# Patient Record
(demographics unavailable — no encounter records)

---

## 2017-01-29 NOTE — ER DOCUMENT REPORT
ED Medical Screen (RME)





- General


Stated Complaint: FLU LIKE SYMPTOMS


Notes: 


Flulike symptoms for several days diarrhea for 2 days


I greeted and performed a rapid initial assessment of this patient. 

Comprehensive ED assessment and evaluation of the patient, analysis of test 

results and completion of the medical decision making process will be conducted 

by additional ED providers.





Physical Exam





- Vital signs


Vitals: 





 











Temp Pulse Resp BP Pulse Ox


 


 100.3 F   97   16   106/66   96 


 


 01/29/17 13:23  01/29/17 13:23  01/29/17 13:23  01/29/17 13:23  01/29/17 13:23














Course





- Vital Signs


Vital signs: 





 











Temp Pulse Resp BP Pulse Ox


 


 100.3 F   97   16   106/66   96 


 


 01/29/17 13:23  01/29/17 13:23  01/29/17 13:23  01/29/17 13:23  01/29/17 13:23

## 2017-01-29 NOTE — ER DOCUMENT REPORT
ED General





- General


Chief Complaint: Flu Symptoms


Stated Complaint: FLU LIKE SYMPTOMS


Mode of Arrival: Ambulatory


Information source: Patient


Notes: 


54-year-old female presents with one week duration of sore throat and 

nonproductive cough shortness of breath.  Patient admits to fevers and chills 

nausea vomiting and diarrhea.  Patient had a telemedicine evaluion and was 

placed on azithromycin








TRAVEL OUTSIDE OF THE [place] IN LAST 30 DAYS: No





- HPI


Onset: Last week


Onset/Duration: Persistent


Quality of pain: Achy


Severity: Mild


Pain Level: 1


Associated symptoms: Nonproductive cough, Diarrhea, Fever, Nausea, Vomiting


Exacerbated by: Denies


Relieved by: Denies


Similar symptoms previously: Yes


Recently seen / treated by doctor: Yes





- Related Data


Allergies/Adverse Reactions: 


 





codeine Allergy (Verified 01/29/17 13:27)


 











Past Medical History





- Social History


Smoking Status: Never Smoker


Cigarette use (# per day): No


Chew tobacco use (# tins/day): No


Smoking Education Provided: No


Frequency of alcohol use: None


Drug Abuse: None


Family History: Reviewed & Not Pertinent


Patient has suicidal ideation: No


Patient has homicidal ideation: No


Renal/ Medical History: Denies: Hx Peritoneal Dialysis





Review of Systems





- Review of Systems


Notes: 


REVIEW OF SYSTEMS:


CONSTITUTIONAL : Admits fever


EENT: Admits to sore throat


CARDIOVASCULAR:  Denies chest pain.  Denies palpitations or racing or irregular 

heart beat.  Denies ankle edema.


RESPIRATORY: Admits cough congestion


GASTROINTESTINAL: Admits nausea vomiting diarrhea


GENITOURINARY:  Denies difficulty urinating, painful urination, burning, 

frequency, blood in urine, or discharge.


FEMALE  GENITOURINARY:  Denies vaginal bleeding, heavy or abnormal periods, 

irregular periods.  Denies vaginal discharge or odor. 


MUSCULOSKELETAL:  Denies back or neck pain or stiffness.  Denies joint pain or 

swelling.


SKIN:   Denies rash, lesions or sores.


HEMATOLOGIC :   Denies easy bruising or bleeding.


LYMPHATIC:  Denies swollen, enlarged glands.


NEUROLOGICAL:  Denies confusion or altered mental status.  Denies passing out 

or loss of consciousness.  Denies dizziness or lightheadedness.  Denies 

headache.  Denies weakness or paralysis or loss of use of either side.  Denies 

problems with gait or speech.  Denies sensory loss, numbness, or tingling.  

Denies seizures.


PSYCHIATRIC:  Denies anxiety or stress.  Denies depression, suicidal ideation, 

or homicidal ideation.





ALL OTHER SYSTEMS REVIEWED AND NEGATIVE.








Dictation was performed using Dragon voice recognition software 








PHYSICAL EXAMINATION:





GENERAL: Well-appearing, well-nourished and in no acute distress.





HEAD: Atraumatic, normocephalic.





EYES: Pupils equal round and reactive to light, extraocular movements intact, 

conjunctiva are normal.





ENT: exudated 2 is on the right tonsillar pillar 3 on the left airway patent





NECK: Normal range of motion, supple without lymphadenopathy





LUNGS: Breath sounds clear to auscultation bilaterally and equal.  No wheezes 

rales or rhonchi.





HEART: Regular rate and rhythm without murmurs





ABDOMEN: Soft, nontender, nondistended abdomen.  No guarding, no rebound.  No 

masses appreciated.





Female : deferred





Musculoskeletal: Normal range of motion, no pitting or edema.  No cyanosis.





NEUROLOGICAL: Cranial nerves grossly intact.  Normal speech, normal gait.  

Normal sensory, motor exams





PSYCH: Normal mood, normal affect.





SKIN: Warm, Dry, normal turgor, no rashes or lesions noted.




















Physical Exam





- Vital signs


Vitals: 





 











Temp Pulse Resp BP Pulse Ox


 


 100.3 F   97   16   106/66   96 


 


 01/29/17 13:23  01/29/17 13:23  01/29/17 13:23  01/29/17 13:23  01/29/17 13:23














Course





- Re-evaluation


Re-evalutation: 


01/29/17 15:12





On examination patient does have pustular exudates, however given that her x-

rays consistent with pneumonia I will treat her with Levaquin patient had 

probable viral syndrome that worsened to strep pneumonia.  Otherwise vital 

signs are stable, she is febrile here given Tylenol notes significant 

improvement with breathing treatment











After performing a Medical Screening Examination, I estimate there is LOW risk 

for ACUTE CORONARY SYNDROME, RESPIRATORY FAILURE, SEPSIS OR MENINGITIS, thus I 

consider the discharge disposition reasonable. The patient and I have discussed 

the diagnosis and risks, and we agree with discharging home with close follow-

up. We also discussed returning to the Emergency Department immediately if new 

or worsening symptoms occur. We have discussed the symptoms which are most 

concerning (e.g., changing or worsening pain, trouble swallowing or breathing, 

neck stiffness, fever) that necessitate immediate return.





01/29/17 15:13








- Vital Signs


Vital signs: 





 











Temp Pulse Resp BP Pulse Ox


 


 100.3 F   97   16   106/66   96 


 


 01/29/17 13:23  01/29/17 13:23  01/29/17 13:23  01/29/17 13:23  01/29/17 13:23














- Diagnostic Test


Radiology reviewed: Image reviewed, Reports reviewed - RLL peumonia report to 

patient





Discharge





- Discharge


Clinical Impression: 


 Nausea vomiting and diarrhea





Pneumonia


Qualifiers:


 Pneumonia type: due to unspecified organism Laterality: right Lung location: 

lower lobe of lung Qualified Code(s): J18.1 - Lobar pneumonia, unspecified 

organism





Fever


Qualifiers:


 Fever type: unspecified Qualified Code(s): R50.9 - Fever, unspecified





Condition: Stable


Disposition: HOME, SELF-CARE


Instructions:  Pneumonia ([facility])


Additional Instructions: 


Follow up with your physician tomorrow for further care or return to the ED 

IMMEDIATELY if symptoms worsen or new concerns occur


Prescriptions: 


Levofloxacin [Levaquin 750 mg Tablet] 750 mg PO DAILY #5 tablet


Prednisone [Deltasone 20 mg Tablet] 3 tab PO DAILY 5 Days


Promethazine HCl [Phenergan 25 mg Tablet] 1 - 2 tab PO Q6H PRN #15 tablet


 PRN Reason:

## 2017-11-29 NOTE — WOMENS IMAGING REPORT
EXAM DESCRIPTION:  BILAT SCREENING MAMMO W/CAD



COMPLETED DATE/TIME:  11/29/2017 10:03 am



REASON FOR STUDY:  ROUTINE SCREENING; Z12.31 Z12.31  ENCNTR SCREEN MAMMOGRAM FOR MALIGNANT NEOPLASM O
F MARCIA



COMPARISON:  No previous available



TECHNIQUE:  Standard craniocaudal and mediolateral oblique views of each breast recorded using digita
l acquisition. Additional "push-back" craniocaudal and mediolateral oblique images acquired.



LIMITATIONS:  None.



FINDINGS:  IMPLANTS: Bilateral subpectoral intact

RIGHT BREAST

MASSES: No suspicious masses.

CALCIFICATIONS: No new or suspicious calcifications.

ARCHITECTURAL DISTORTION: None.

DEVELOPING DENSITY: None.

ASYMMETRY: None noted.

OTHER: No other significant findings.

LEFT BREAST

MASSES: Tiny 3 to 4 mm low-density well-circumscribed nodule in the left breast periareolar region 11
 to 12 o'clock position.  Ultrasound of this finding is recommended for followup.

CALCIFICATIONS: No new or suspicious calcifications.

ARCHITECTURAL DISTORTION: None.

DEVELOPING DENSITY: None.

ASYMMETRY: None noted.

OTHER: No other significant findings.

Read with the assistance of CAD.

.[facility] - R2 Cenova Version 1.3

.[facility] - R2 Cenova Version 1.3

.[facility] - R2 Cenova Version 2.4

.[facility] [place]2 [place]ova Version 2.4

.[facility] - R2  Version 9.2



IMPRESSION:  No mammographic evidence for malignancy right breast.

3 to 4 mm well-circumscribed low-density nodule left periareolar region for which ultrasound is recom
mended for followup



BREAST DENSITY:  b. There are scattered areas of fibroglandular density.



BIRAD:  0 Incomplete:  Needs Additional Imaging Evaluation and/or prior Mammograms for Comparison.



RECOMMENDATION:  RECOMMENDED FOLLOW-UP: Left breast ultrasound

The patient will be contacted for additional imaging.



COMMENT:  The patient has been notified of the results by letter per SA requirements. Additional no
tification policies are in place for contacting patient with suspicious or incomplete findings.

Quality ID #225: The American College of Radiology recommends an annual screening mammogram for women
 aged 40 years or over. This facility utilizes a reminder system to ensure that all patients receive 
reminder letters, and/or direct phone calls for appointments. This includes reminders for routine scr
eening mammograms, diagnostic mammograms, or other Breast Imaging Interventions when appropriate.  Th
is patient will be placed in the appropriate reminder system.

The American College of Radiology (ACR) has developed recommendations for screening MRI of the breast
s in certain patient populations, to be used in conjunction with mammography.  Breast MRI surveillanc
e may be appropriate for women with more than 20% lifetime risk of developing breast cancer  as deter
mined by genetic testing, significant family history of the disease, or history of mantle radiation f
or Hodgkins Disease.  ACR Practice Guidelines 2008.



TECHNICAL DOCUMENTATION:  FINDING NUMBER: (1)

ASSESSMENT: (1)

JOB ID:  [id number]

 2011 [employer]- All Rights Reserved

## 2017-12-12 NOTE — WOMENS IMAGING REPORT
EXAM DESCRIPTION:  U/S BREAST UNILAT LIMITED



COMPLETED DATE/TIME:  12/12/2017 11:07 am



REASON FOR STUDY:  UNSPECIFIED LUMP; N63.22 N63.22  UNSPECIFIED LUMP IN THE LEFT BREAST, UPPER INNER 
QUAD



COMPARISON:  Bilateral mammogram 11/29/2017



TECHNIQUE:  Real-time and static grayscale imaging performed of the left  breast targeted to the area
 of mammographic concern. Selected color Doppler images recorded.



LIMITATIONS:  None.



FINDINGS:  Ultrasound of the left breast retroareolar region was performed at the 11 to 12 o'clock po
sition.  A small hypoechoic nodule with slightly lobular borders and adjacent color flow is present, 
measuring about  4 mm in size.  This correlates with the mammographic findings.  This is not clearly 
a fibroadenoma or simple cysts.  This could be a small papilloma.  Malignancy could not entirely be e
xcluded.  Ultrasound-guided core biopsy and post biopsy clip placement is recommended, with immediate
 post procedure follow-up two-view mammogram.

These findings were discussed with Isaura Maldonado PA-C, 1320 hours 12/12/2017



IMPRESSION:  4 mm solid nodule left breast retroareolar region 11 o'clock position, indeterminate for
 malignancy.  Ultrasound-guided core biopsy, post biopsy clip placement with immediate follow-up two-
view mammogram recommended.



BIRAD:  4 Suspicious. Biopsy should be considered.



RECOMMENDATION:  RECOMMENDED FOLLOW-UP: Ultrasound-guided core biopsy and post biopsy clip placement 
with follow-up post procedure two-view mammogram recommended



COMMENT:  These findings were discussed with Isaura Maldonado PA-C, 1320 hours 12/12/2017

The American College of Radiology (ACR) has developed recommendations for screening MRI of the breast
s in certain patient populations, to be used in conjunction with mammography.  Breast MRI surveillanc
e may be appropriate for women with more than 20% lifetime risk of developing breast cancer  as deter
mined by genetic testing, significant family history of the disease, or history of mantle radiation f
or Hodgkins Disease.  ACR Practice Guidelines 2008.



TECHNICAL DOCUMENTATION:  JOB ID:  [id number]

 2011 [employer]- All Rights Reserved

## 2017-12-18 NOTE — RADIOLOGY REPORT (SQ)
EXAM DESCRIPTION:  CT HEAD WITHOUT



COMPLETED DATE/TIME:  12/18/2017 10:53 am



REASON FOR STUDY:  syncope facial pain



COMPARISON:  None.



TECHNIQUE:  Axial images acquired through the brain without intravenous contrast.  Images reviewed wi
th bone, brain and subdural windows.  Images stored on PACS.

All CT scanners at this facility use dose modulation, iterative reconstruction, and/or weight based d
osing when appropriate to reduce radiation dose to as low as reasonably achievable (ALARA).

[facility]: Dose Right  [facility]: CareDose    [facility]: Dose Right    [facility]: Teradose 4D    [facility]: Smart Technologies



RADIATION DOSE:  CT Rad equipment meets quality standard of care and radiation dose reduction techniq
ues were employed. CTDIvol: 64.6 mGy. DLP: 1163 mGy-cm. mGy.



LIMITATIONS:  None.



FINDINGS:  VENTRICLES: Normal size and contour.

CEREBRUM: No masses.  No hemorrhage.  No midline shift.  No evidence for acute infarction. Normal gra
y/white matter differentiation. No areas of low density in the white matter.

CEREBELLUM: No masses.  No hemorrhage.  No alteration of density.  No evidence for acute infarction.

EXTRAAXIAL SPACES: No fluid collections.  No masses.

ORBITS AND GLOBE: No intra- or extraconal masses.  Normal contour of globe without masses.

CALVARIUM: No fracture.

PARANASAL SINUSES: No fluid or mucosal thickening.

SOFT TISSUES: No mass or hematoma.

OTHER: No other significant finding.



IMPRESSION:  NORMAL BRAIN CT WITHOUT CONTRAST.

EVIDENCE OF ACUTE STROKE: NO.



COMMENT:  Quality ID # 436: Final reports with documentation of one or more dose reduction techniques
 (e.g., Automated exposure control, adjustment of the mA and/or kV according to patient size, use of 
iterative reconstruction technique)



TECHNICAL DOCUMENTATION:  JOB ID:  [id number]

 2011 [employer]- All Rights Reserved

## 2017-12-18 NOTE — RADIOLOGY REPORT (SQ)
EXAM DESCRIPTION:  CT FACIAL AREA WITHOUT



COMPLETED DATE/TIME:  12/18/2017 10:53 am



REASON FOR STUDY:  syncope facial pain



COMPARISON:  None.



TECHNIQUE:  Noncontrasted images through the facial bones and orbits windowed for bone and soft tissu
e.  Additional coronal and sagittal reconstructed images reviewed.  All images stored on PACS.

All CT scanners at this facility use dose modulation, iterative reconstruction, and/or weight based d
osing when appropriate to reduce radiation dose to as low as reasonably achievable (ALARA).

[facility]: Dose Right  [facility]: CareDose    [facility]: Dose Right    [facility]: Teradose 4D    [facility]: Smart Technologies



RADIATION DOSE:  CT Rad equipment meets quality standard of care and radiation dose reduction techniq
ues were employed. CTDIvol: 30.4 mGy. DLP: 553 mGy-cm. mGy.



LIMITATIONS:  None.



FINDINGS:  FACIAL BONES: There is a fracture of the tip of the superior nasal spine.  This is seen on
 image 28 series 200.  This may not be acute.

ORBITS: Intact.  No fracture.  Symmetric intact globes and retroorbital soft tissues.

PARANASAL SINUSES: Clear.  No significant mucosal thickening, mass or fluid. No nasal polyps.  Maxill
emi sinus outlets are patent.

SOFT TISSUES: No mass or edema.

INFERIOR BRAIN: See report for CT of head.

OTHER: No other significant finding.



IMPRESSION:  There is a fracture of the tip of the superior nasal spine that may not be acute.



TECHNICAL DOCUMENTATION:  JOB ID:  [id number]

Quality ID # 436: Final reports with documentation of one or more dose reduction techniques (e.g., Au
tomated exposure control, adjustment of the mA and/or kV according to patient size, use of iterative 
reconstruction technique)

 2011 [employer]- All Rights Reserved

## 2017-12-18 NOTE — ER DOCUMENT REPORT
ED General





- General


Chief Complaint: Syncope


Stated Complaint: POSSIBLE SYNCOPE


Time Seen by Provider: 12/18/17 10:00


TRAVEL OUTSIDE OF THE [place] IN LAST 30 DAYS: No





- HPI


Patient complains to provider of: Syncopal episode


Notes: 





Patient was having a breast biopsy later a mammogram performed and had a 

syncopal episode [facility].  According to the rat tech and 

radiologist patient was having her breast tissue squeezing the mammogram when 

she felt lightheaded dizzy and fell to the floor hitting her head.  Patient 

recalls becoming lightheaded state feeling like she is about to pass out the 

next thing she remembers she was on the floor.  Patient denies any fevers 

chills nausea vomiting diarrhea.  Patient denies any chest pain abdominal pain.

  Patient states feeling better at this time.  Patient denies any serious 

pathology or complaints today prior.  Patient does complain of face pain.





- Related Data


Allergies/Adverse Reactions: 


 





codeine Allergy (Verified 01/29/17 13:27)


 











Past Medical History





- Social History


Smoking Status: Never Smoker


Chew tobacco use (# tins/day): No


Frequency of alcohol use: None


Drug Abuse: None


Family History: Reviewed & Not Pertinent


Patient has suicidal ideation: No


Patient has homicidal ideation: No





- Past Medical History


Cardiac Medical History: Reports: Hx Hypercholesterolemia


Renal/ Medical History: Denies: Hx Peritoneal Dialysis





Review of Systems





- Review of Systems


Constitutional: No symptoms reported


EENT: No symptoms reported


Cardiovascular: Syncope


Respiratory: No symptoms reported


Gastrointestinal: No symptoms reported


Genitourinary: No symptoms reported


Female Genitourinary: No symptoms reported


Musculoskeletal: No symptoms reported


Skin: No symptoms reported


Hematologic/Lymphatic: No symptoms reported


Neurological/Psychological: No symptoms reported





Physical Exam





- Vital signs


Vitals: 


 











Resp


 


 14 


 


 12/18/17 09:51











Interpretation: Normal





- General


General appearance: Appears well, Alert





- HEENT


Head: Normocephalic, Atraumatic


Eyes: Normal


Conjunctiva: Normal


Cornea: Normal


Extraocular movements intact: Yes


Pupils: PERRL


Ears: Normal


External canal: Normal


Tympanic membrane: Normal


Sinus: Normal


Nasal: Other - Mild swelling to the nasal bridge tenderness to palpation.  No 

malocclusion no tenderness to palpation of the zygomatic arches.


Pharynx: Normal


Neck: Normal





- Respiratory


Respiratory status: No respiratory distress


Chest status: Nontender


Breath sounds: Normal


Chest palpation: Normal





- Cardiovascular


Rhythm: Regular


Heart sounds: Normal auscultation


Murmur: No





- Abdominal


Inspection: Normal


Distension: No distension


Bowel sounds: Normal


Tenderness: Nontender


Organomegaly: No organomegaly





- Back


Back: Normal, Nontender





- Extremities


General upper extremity: Normal inspection, Nontender, Normal color, Normal ROM

, Normal temperature


General lower extremity: Normal inspection, Nontender, Normal color, Normal ROM

, Normal temperature, Normal weight bearing.  No: Francisco's sign





- Neurological


Neuro grossly intact: Yes


Cognition: Normal


Orientation: AAOx4


Sun Coma Scale Eye Opening: Spontaneous


Sun Coma Scale Verbal: Oriented


[place] Coma Scale Motor: Obeys Commands


Sun Coma Scale Total: 15


Speech: Normal


Motor strength normal: LUE, RUE, LLE, RLE


Sensory: Normal





- Psychological


Associated symptoms: Normal affect, Normal mood





- Skin


Skin Temperature: Warm


Skin Moisture: Dry


Skin Color: Normal





Course





- Re-evaluation


Re-evalutation: 





12/18/17 14:31


The patient has syncope as the patient's chest pain is not suggestive of 

pulmonary embolus, cardiac ischemia, aortic dissection, or other serious 

etiology. Given the extremely low risk of these diagnoses further testing and 

evaluation for these possibilities does not appear to be indicated at this 

time. The patient has been instructed to return if the symptoms worsen or 

change in any way.  Patient feeling better after hydration.  More likely had a 

vasovagal syncopal episode.  Patient will be discharged home follow-up with 

primary care as needed.





- Vital Signs


Vital signs: 


 











Temp Pulse Resp BP Pulse Ox


 


 98 F   67   12   114/87 H  96 


 


 12/18/17 13:13  12/18/17 11:06  12/18/17 13:01  12/18/17 13:00  12/18/17 13:01














- Laboratory


Result Diagrams: 


 12/18/17 10:30





 12/18/17 10:30


Laboratory results interpreted by me: 


 











  12/18/17





  10:30


 


BUN  21 H














Discharge





- Discharge


Clinical Impression: 


Syncope


Qualifiers:


 Syncope type: unspecified Qualified Code(s): R55 - Syncope and collapse





Nasal bone fracture


Qualifiers:


 Encounter type: initial encounter Fracture type: closed Qualified Code(s): 

S02.2XXA - Fracture of nasal bones, initial encounter for closed fracture





Condition: Good


Disposition: HOME, SELF-CARE


Instructions:  ENT, Fracture of the Nose ([facility]), Syncopal Episode ([facility])


Additional Instructions: 


Follow-up with your doctor as needed.  Laboratory studies not show any acute 

findings.  Your CT scan does show that she fractured the tip of the nose.  He 

can follow up with ENT later to have this realigned there is no need for 

fixation at this time.  Yourwell and there may be some bruising this will 

eventually go down.  He may place ice on her nose.  Will recommend taking 

Tylenol Motrin more likely will have some more muscle aches and neck pain for 

the next 2-3 days.  If any symptoms worsen please return to the ER.  Please 

make sure you are drinking plenty water to stay hydrated.


Referrals: 


SHELLY LAGOS PA-C [Primary Care Provider] - Follow up as needed

## 2017-12-18 NOTE — RADIOLOGY REPORT (SQ)
EXAM DESCRIPTION:  CHEST PA/LAT



COMPLETED DATE/TIME:  12/18/2017 10:53 am



REASON FOR STUDY:  sob



COMPARISON:  1/29/2017



EXAM PARAMETERS:  NUMBER OF VIEWS: two views

TECHNIQUE: Digital Frontal and Lateral radiographic views of the chest acquired.

RADIATION DOSE: NA

LIMITATIONS: none



FINDINGS:  LUNGS AND PLEURA: No opacities, masses or pneumothorax. No pleural effusion.

MEDIASTINUM AND HILAR STRUCTURES: No masses or contour abnormalities.

HEART AND VASCULAR STRUCTURES: Heart normal size.  No evidence for failure.

BONES: No acute findings.

HARDWARE: None in the chest.

OTHER: No other significant finding.



IMPRESSION:  NO SIGNIFICANT RADIOGRAPHIC FINDING IN THE CHEST.



TECHNICAL DOCUMENTATION:  JOB ID:  [id number]

 2011 [employer]- All Rights Reserved

## 2017-12-19 NOTE — WOMENS IMAGING REPORT
EXAM DESCRIPTION:  U/S BREAST BX; LEFT DIG DX MAMMO NO CHG



COMPLETED DATE/TIME:  12/18/2017 10:20 am; 12/18/2017 9:47 am



REASON FOR STUDY:  OTHER ABNORMAL AND INCONCLUSIVE FINDINGS; R92.8 MAMMO FOR CLIP PLACEMENT LEFT R92.
8  OTH ABN AND INCONCLUSIVE FINDINGS ON DX IMAGING OF MARCIA



COMPARISON:  11/29/2017 mammograms



TECHNIQUE:  The procedure was discussed with the patient and the patient agreed to proceed.

The patient was scanned and the area of interest in the 11 to 12 o'clock position 4 cm from the nippl
e of the left breast was localized.  This correlates with the area of concern on prior imaging studie
s. This area was targeted for ultrasound-guided core biopsy.

After sterile skin prep and 2.5 mL local lidocaine 1% for skin and deep tissue anesthesia, a 14 gauge
 coaxial core biopsy needle was used to obtain several cores of tissue from the lesion.  Under ultras
ound guidance, a ribbon clip was placed in the areas sampled.  There were no immediate post-procedure
 complications.

MAMMOGRAM: Post-procedure two view mammogram was acquired in the digital mammogram suite. The clip wa
s in the expected location. Small superficial hematoma.  The patient had a vagal episode immediately 
after the left breast postprocedure two-view mammogram.  She was evaluated and treated in the emergen
cy room.

Pathology yields a diagnosis of fibroadenoma

Pathology is concordant.



LIMITATIONS:  None.



FINDINGS:  Ultrasound guided breast biopsy as described above.

POST PROCEDURE MAMMOGRAMS FOR MARKER PLACEMENT: Yes



IMPRESSION:  ULTRASOUND-GUIDED CORE BIOPSY OF THE LEFT BREAST YIELDS A DIAGNOSIS OF BENIGN FIBROADENO
MA

BI-RADS  2 Benign findings.

PATIENT CAN RESUME YEARLY BILATERAL SCREENING MAMMOGRAPHY.  RESULTS WERE DISCUSSED DIRECTLY WITH THE 
PATIENT, 12/19/2017, AT 1000 HOURS



COMMENT:  PLEASE CONTINUE BILATERAL SCREENING MAMMOGRAPHY IN NOVEMBER 2018

COMMUNICATION: Results were discussed with the patient, 1000 hours 12/19/2017.

Patient medication list reviewed: Yes- Quality ID# [id number]:Eligible professional attests to documenting i
n the medical record they obtained, updated, or reviewed the patient's current medications.



TECHNICAL DOCUMENTATION:  JOB ID:  [id number]

 2011 [employer]- All Rights Reserved

## 2017-12-19 NOTE — WOMENS IMAGING REPORT
EXAM DESCRIPTION:  U/S BREAST BX; LEFT DIG DX MAMMO NO CHG



COMPLETED DATE/TIME:  12/18/2017 10:20 am; 12/18/2017 9:47 am



REASON FOR STUDY:  OTHER ABNORMAL AND INCONCLUSIVE FINDINGS; R92.8 MAMMO FOR CLIP PLACEMENT LEFT R92.
8  OTH ABN AND INCONCLUSIVE FINDINGS ON DX IMAGING OF MARCIA



COMPARISON:  11/29/2017 mammograms



TECHNIQUE:  The procedure was discussed with the patient and the patient agreed to proceed.

The patient was scanned and the area of interest in the 11 to 12 o'clock position 4 cm from the nippl
e of the left breast was localized.  This correlates with the area of concern on prior imaging studie
s. This area was targeted for ultrasound-guided core biopsy.

After sterile skin prep and 2.5 mL local lidocaine 1% for skin and deep tissue anesthesia, a 14 gauge
 coaxial core biopsy needle was used to obtain several cores of tissue from the lesion.  Under ultras
ound guidance, a ribbon clip was placed in the areas sampled.  There were no immediate post-procedure
 complications.

MAMMOGRAM: Post-procedure two view mammogram was acquired in the digital mammogram suite. The clip wa
s in the expected location. Small superficial hematoma.  The patient had a vagal episode immediately 
after the left breast postprocedure two-view mammogram.  She was evaluated and treated in the emergen
cy room.

Pathology yields a diagnosis of fibroadenoma

Pathology is concordant.



LIMITATIONS:  None.



FINDINGS:  Ultrasound guided breast biopsy as described above.

POST PROCEDURE MAMMOGRAMS FOR MARKER PLACEMENT: Yes



IMPRESSION:  ULTRASOUND-GUIDED CORE BIOPSY OF THE LEFT BREAST YIELDS A DIAGNOSIS OF BENIGN FIBROADENO
MA

BI-RADS  2 Benign findings.

PATIENT CAN RESUME YEARLY BILATERAL SCREENING MAMMOGRAPHY.  RESULTS WERE DISCUSSED DIRECTLY WITH THE 
PATIENT, 12/19/2017, AT 1000 HOURS



COMMENT:  PLEASE CONTINUE BILATERAL SCREENING MAMMOGRAPHY IN NOVEMBER 2018

COMMUNICATION: Results were discussed with the patient, 1000 hours 12/19/2017.

Patient medication list reviewed: Yes- Quality ID# [id number]:Eligible professional attests to documenting i
n the medical record they obtained, updated, or reviewed the patient's current medications.



TECHNICAL DOCUMENTATION:  JOB ID:  [id number]

 2011 [employer]- All Rights Reserved